# Patient Record
Sex: FEMALE | Race: WHITE | NOT HISPANIC OR LATINO | URBAN - METROPOLITAN AREA
[De-identification: names, ages, dates, MRNs, and addresses within clinical notes are randomized per-mention and may not be internally consistent; named-entity substitution may affect disease eponyms.]

---

## 2021-02-02 ENCOUNTER — OFFICE VISIT (OUTPATIENT)
Dept: URBAN - METROPOLITAN AREA CLINIC 70 | Facility: CLINIC | Age: 76
End: 2021-02-02

## 2021-02-02 ENCOUNTER — WEB ENCOUNTER (OUTPATIENT)
Dept: URBAN - METROPOLITAN AREA CLINIC 70 | Facility: CLINIC | Age: 76
End: 2021-02-02

## 2021-02-02 ENCOUNTER — OFFICE VISIT (OUTPATIENT)
Dept: URBAN - METROPOLITAN AREA CLINIC 70 | Facility: CLINIC | Age: 76
End: 2021-02-02
Payer: MEDICARE

## 2021-02-02 VITALS
HEART RATE: 88 BPM | WEIGHT: 112.2 LBS | DIASTOLIC BLOOD PRESSURE: 71 MMHG | SYSTOLIC BLOOD PRESSURE: 123 MMHG | HEIGHT: 60 IN | BODY MASS INDEX: 22.03 KG/M2 | TEMPERATURE: 97.8 F

## 2021-02-02 DIAGNOSIS — R10.84 ABDOMINAL CRAMPING, GENERALIZED: ICD-10-CM

## 2021-02-02 DIAGNOSIS — K59.01 CONSTIPATION: ICD-10-CM

## 2021-02-02 PROCEDURE — G8420 CALC BMI NORM PARAMETERS: HCPCS | Performed by: INTERNAL MEDICINE

## 2021-02-02 PROCEDURE — G8427 DOCREV CUR MEDS BY ELIG CLIN: HCPCS | Performed by: INTERNAL MEDICINE

## 2021-02-02 PROCEDURE — 99214 OFFICE O/P EST MOD 30 MIN: CPT | Performed by: INTERNAL MEDICINE

## 2021-02-02 PROCEDURE — G8482 FLU IMMUNIZE ORDER/ADMIN: HCPCS | Performed by: INTERNAL MEDICINE

## 2021-02-02 PROCEDURE — 1036F TOBACCO NON-USER: CPT | Performed by: INTERNAL MEDICINE

## 2021-02-02 RX ORDER — FENOFIBRATE 145 MG/1
1 TABLET TABLET, FILM COATED ORAL ONCE A DAY
Status: ACTIVE | COMMUNITY

## 2021-02-02 RX ORDER — AMLODIPINE BESYLATE 2.5 MG
1 TABLET TABLET ORAL ONCE A DAY
Status: ACTIVE | COMMUNITY

## 2021-02-02 RX ORDER — LABETALOL HYDROCHLORIDE 300 MG/1
1 TABLET TABLET ORAL TWICE A DAY
Status: ACTIVE | COMMUNITY

## 2021-02-02 RX ORDER — ATORVASTATIN CALCIUM 40 MG/1
1 TABLET TABLET, FILM COATED ORAL ONCE A DAY
Status: ACTIVE | COMMUNITY

## 2021-02-02 RX ORDER — GEMFIBROZIL 600 MG/1
TAKE 1 TABLET (600 MG) BY ORAL ROUTE 2 TIMES PER DAY 30 MINUTES BEFORE MORNING AND EVENING MEAL TABLET, FILM COATED ORAL 2
Qty: 0 | Refills: 0 | Status: DISCONTINUED | COMMUNITY
Start: 1900-01-01

## 2021-02-02 NOTE — HPI-TODAY'S VISIT:
Pt presents with abdominal pain. Pain has been present for 2-3 months. Pain is located in the right side of the abdomen and lower back. Pain occurs for hours at a time. Pain is described as dull and achy. Pain is aggrevated by nothing in particular. Pain is alleviated by nothing in particular. Pain occurs daily. Associated symptoms are constipation. She has been drinking prune juice and taking stool softeners with minimal improvement. She was treated with Abx by her PCP for suspected diverticulitis with no improvement. Prior workup was a CT scan in the ED on 11/30/20 that showed diverticulosis with no diverticulitis. Also showed possible cystitis and nephritis. She f/u with her nephrologist who told her everything was fine from their standpoint. She does have a history of diverticulitis in 2018. Colnoscopy on 6/5/18 showed diverticulosis.

## 2021-03-02 ENCOUNTER — OFFICE VISIT (OUTPATIENT)
Dept: URBAN - METROPOLITAN AREA CLINIC 70 | Facility: CLINIC | Age: 76
End: 2021-03-02

## 2021-03-02 RX ORDER — FENOFIBRATE 145 MG/1
1 TABLET TABLET, FILM COATED ORAL ONCE A DAY
Status: ACTIVE | COMMUNITY

## 2021-03-02 RX ORDER — AMLODIPINE BESYLATE 2.5 MG
1 TABLET TABLET ORAL ONCE A DAY
Status: ACTIVE | COMMUNITY

## 2021-03-02 RX ORDER — LABETALOL HYDROCHLORIDE 300 MG/1
1 TABLET TABLET ORAL TWICE A DAY
Status: ACTIVE | COMMUNITY

## 2021-03-02 RX ORDER — ATORVASTATIN CALCIUM 40 MG/1
1 TABLET TABLET, FILM COATED ORAL ONCE A DAY
Status: ACTIVE | COMMUNITY

## 2021-05-21 ENCOUNTER — OFFICE VISIT (OUTPATIENT)
Dept: URBAN - METROPOLITAN AREA CLINIC 70 | Facility: CLINIC | Age: 76
End: 2021-05-21
Payer: MEDICARE

## 2021-05-21 ENCOUNTER — LAB OUTSIDE AN ENCOUNTER (OUTPATIENT)
Dept: URBAN - METROPOLITAN AREA CLINIC 70 | Facility: CLINIC | Age: 76
End: 2021-05-21

## 2021-05-21 ENCOUNTER — WEB ENCOUNTER (OUTPATIENT)
Dept: URBAN - METROPOLITAN AREA CLINIC 70 | Facility: CLINIC | Age: 76
End: 2021-05-21

## 2021-05-21 VITALS
HEART RATE: 78 BPM | BODY MASS INDEX: 22.15 KG/M2 | SYSTOLIC BLOOD PRESSURE: 134 MMHG | WEIGHT: 112.8 LBS | HEIGHT: 60 IN | TEMPERATURE: 97.5 F | DIASTOLIC BLOOD PRESSURE: 68 MMHG

## 2021-05-21 DIAGNOSIS — K59.01 CONSTIPATION: ICD-10-CM

## 2021-05-21 DIAGNOSIS — R10.30 LOWER ABDOMINAL PAIN: ICD-10-CM

## 2021-05-21 PROBLEM — 14760008 CONSTIPATION: Status: ACTIVE | Noted: 2021-02-02

## 2021-05-21 PROCEDURE — 99214 OFFICE O/P EST MOD 30 MIN: CPT | Performed by: INTERNAL MEDICINE

## 2021-05-21 RX ORDER — LINACLOTIDE 72 UG/1
1 CAPSULE AT LEAST 30 MINUTES BEFORE THE FIRST MEAL OF THE DAY ON AN EMPTY STOMACH CAPSULE, GELATIN COATED ORAL ONCE A DAY
Qty: 90 | Refills: 0 | OUTPATIENT
Start: 2021-05-21 | End: 2021-08-19

## 2021-05-21 RX ORDER — FENOFIBRATE 145 MG/1
1 TABLET TABLET, FILM COATED ORAL ONCE A DAY
Status: ACTIVE | COMMUNITY

## 2021-05-21 RX ORDER — LABETALOL HYDROCHLORIDE 300 MG/1
1 TABLET TABLET ORAL TWICE A DAY
Status: ACTIVE | COMMUNITY

## 2021-05-21 RX ORDER — ATORVASTATIN CALCIUM 40 MG/1
1 TABLET TABLET, FILM COATED ORAL ONCE A DAY
Status: ACTIVE | COMMUNITY

## 2021-05-21 RX ORDER — AMLODIPINE BESYLATE 2.5 MG
1 TABLET TABLET ORAL ONCE A DAY
Status: ACTIVE | COMMUNITY

## 2021-05-21 NOTE — HPI-TODAY'S VISIT:
Note from OV 2/2/21: Pt presents with abdominal pain. Pain has been present for 2-3 months. Pain is located in the right side of the abdomen and lower back. Pain occurs for hours at a time. Pain is described as dull and achy. Pain is aggrevated by nothing in particular. Pain is alleviated by nothing in particular. Pain occurs daily. Associated symptoms are constipation. She has been drinking prune juice and taking stool softeners with minimal improvement. She was treated with Abx by her PCP for suspected diverticulitis with no improvement. Prior workup was a CT scan in the ED on 11/30/20 that showed diverticulosis with no diverticulitis. Also showed possible cystitis and nephritis. She f/u with her nephrologist who told her everything was fine from their standpoint. She does have a history of diverticulitis in 2018. Colonoscopy on 6/5/18 showed diverticulosis.  TODAY: Bowels are moving well with Miralax but pain persists. The pain occurs intermittently. No particular aggravating or alleviating factors.

## 2021-06-18 ENCOUNTER — LAB OUTSIDE AN ENCOUNTER (OUTPATIENT)
Dept: URBAN - METROPOLITAN AREA CLINIC 70 | Facility: CLINIC | Age: 76
End: 2021-06-18

## 2021-06-18 ENCOUNTER — OFFICE VISIT (OUTPATIENT)
Dept: URBAN - METROPOLITAN AREA CLINIC 70 | Facility: CLINIC | Age: 76
End: 2021-06-18
Payer: MEDICARE

## 2021-06-18 VITALS
DIASTOLIC BLOOD PRESSURE: 76 MMHG | TEMPERATURE: 97.3 F | BODY MASS INDEX: 22.26 KG/M2 | WEIGHT: 113.4 LBS | HEIGHT: 60 IN | HEART RATE: 86 BPM | SYSTOLIC BLOOD PRESSURE: 161 MMHG

## 2021-06-18 DIAGNOSIS — R10.9 LEFT SIDED ABDOMINAL PAIN: ICD-10-CM

## 2021-06-18 PROCEDURE — 99214 OFFICE O/P EST MOD 30 MIN: CPT | Performed by: INTERNAL MEDICINE

## 2021-06-18 RX ORDER — AMLODIPINE BESYLATE 2.5 MG
1 TABLET TABLET ORAL ONCE A DAY
Status: ACTIVE | COMMUNITY

## 2021-06-18 RX ORDER — ATORVASTATIN CALCIUM 40 MG/1
1 TABLET TABLET, FILM COATED ORAL ONCE A DAY
Status: ACTIVE | COMMUNITY

## 2021-06-18 RX ORDER — FENOFIBRATE 145 MG/1
1 TABLET TABLET, FILM COATED ORAL ONCE A DAY
Status: ACTIVE | COMMUNITY

## 2021-06-18 RX ORDER — LABETALOL HYDROCHLORIDE 300 MG/1
1 TABLET TABLET ORAL TWICE A DAY
Status: ACTIVE | COMMUNITY

## 2021-06-18 RX ORDER — LINACLOTIDE 72 UG/1
1 CAPSULE AT LEAST 30 MINUTES BEFORE THE FIRST MEAL OF THE DAY ON AN EMPTY STOMACH CAPSULE, GELATIN COATED ORAL ONCE A DAY
Qty: 90 | Refills: 0 | Status: ACTIVE | COMMUNITY
Start: 2021-05-21 | End: 2021-08-19

## 2021-06-18 RX ORDER — DICYCLOMINE HYDROCHLORIDE 10 MG/1
1 CAPSULE CAPSULE ORAL
Qty: 90 | Refills: 1 | OUTPATIENT
Start: 2021-06-18 | End: 2021-08-17

## 2021-06-18 NOTE — HPI-TODAY'S VISIT:
Note from OV 2/2/21: Pt presents with abdominal pain. Pain has been present for 2-3 months. Pain is located in the left side of the abdomen and lower back. Pain occurs for hours at a time. Pain is described as dull and achy. Pain is aggravated by nothing in particular. Pain is alleviated by nothing in particular. Pain occurs daily. Associated symptoms are constipation. She has been drinking prune juice and taking stool softeners with minimal improvement. She was treated with Abx by her PCP for suspected diverticulitis with no improvement. Prior workup was a noncontrast CT scan in the ED on 11/30/20 that showed diverticulosis with no diverticulitis. Also showed possible cystitis and nephritis. She f/u with her nephrologist who told her everything was fine from their standpoint. She does have a history of diverticulitis in 2018. Colonoscopy on 6/5/18 showed diverticulosis. ----------------------------------------------- Note from 5/21/21: Bowels are moving well with Miralax but pain persists. The pain occurs intermittently. No particular aggravating or alleviating factors. ---------------------------------------------- TODAY: Pt continues to c/o left side abdominal pain. Pain has been present for 7 months. No improvement after Abx for suspected diverticulitis. No improvement with Miralax or Linzess. No particular aggravating or alleviating factors.

## 2021-06-18 NOTE — PHYSICAL EXAM GASTROINTESTINAL
Abdomen , soft, nontender, nondistended , no guarding or rigidity , no masses palpable , normal bowel sounds , Liver and Spleen , no hepatomegaly present , liver nontender

## 2021-06-28 LAB
A/G RATIO: 2.2
ALBUMIN: 4.7
ALKALINE PHOSPHATASE: 42
ALT (SGPT): 19
AST (SGOT): 26
BILIRUBIN, TOTAL: 0.3
BUN/CREATININE RATIO: 9
BUN: 13
C-REACTIVE PROTEIN, QUANT: <1
CALCIUM: 9.5
CARBON DIOXIDE, TOTAL: 22
CHLORIDE: 104
CREATININE: 1.44
EGFR IF AFRICN AM: 40
EGFR IF NONAFRICN AM: 35
GLOBULIN, TOTAL: 2.1
GLUCOSE: 111
HEMATOCRIT: 32.4
HEMOGLOBIN: 10.6
MCH: 32.5
MCHC: 32.7
MCV: 99
NRBC: (no result)
PLATELETS: 394
POTASSIUM: 4.4
PROTEIN, TOTAL: 6.8
RBC: 3.26
RDW: 11.7
SEDIMENTATION RATE-WESTERGREN: 2
SODIUM: 139
WBC: 6.1

## 2021-07-09 ENCOUNTER — DASHBOARD ENCOUNTERS (OUTPATIENT)
Age: 76
End: 2021-07-09

## 2021-07-20 ENCOUNTER — OFFICE VISIT (OUTPATIENT)
Dept: URBAN - METROPOLITAN AREA CLINIC 70 | Facility: CLINIC | Age: 76
End: 2021-07-20

## 2022-06-27 NOTE — PHYSICAL EXAM CHEST:
no lesions,  no deformities,  no traumatic injuries,  no significant scars are present,  chest wall non-tender,  no masses present, breathing is unlabored without accessory muscle use,normal breath sounds
negative...